# Patient Record
(demographics unavailable — no encounter records)

---

## 2024-10-31 NOTE — HISTORY OF PRESENT ILLNESS
[FreeTextEntry1] : bug bite not healing  [de-identified] : 73yo M new to dermatology here for below:  # initial evaluation of non healing bug bite on left lower leg. Has not used any prescription creams, using anti-itch OTC cream- not improving  #spots all over body that initially start as bug bites then develop into scabs   Derm hx: denies  Skin cancer hx: denies

## 2024-10-31 NOTE — ASSESSMENT
[FreeTextEntry1] : #Rash  - discussed ddx including arthropod hypersensitivity vs psoriasis vs other  - Recommend tangential shave biopsy for definitive diagnosis.- right upper arm - After informed consent was obtained, using Hibiclens for cleansing and 1% Lidocaine with epinephrine for anesthetic, with sterile technique a shave biopsy was used to obtain a biopsy specimen of the lesion. Hemostasis was obtained with aluminum chloride. Vaseline was applied, and wound care instructions provided. The specimen was labeled and sent to pathology for evaluation. The procedure was well tolerated without complication. - The patient will be contacted with biopsy results  #Arthropod hypersensitivity- left lower leg  - education and counseling  - Diagnosis, chronic course and treatment options discussed with patient  - [place] daily emollients recommended (Cerave, Cetaphil, etc).  - Recommend warm water showers. Avoid hot water. Limit shower time <10 mins.  - Start triamcinolone 0.1% ointment BID x AA 2 weeks on, 2 weeks off. SED including atrophy and hypopigmentation. Do not use on face, axilla or groin    RTC 4 weeks

## 2024-10-31 NOTE — PHYSICAL EXAM
[Alert] : alert [Oriented x 3] : ~L oriented x 3 [Well Nourished] : well nourished [Conjunctiva Non-injected] : conjunctiva non-injected [No Visual Lymphadenopathy] : no visual  lymphadenopathy [Declined] : declined [FreeTextEntry3] :  Focused skin exam performed (per patient preference). Areas examined as below:   - left lower leg with erythematous plaque with scale  - multiple scattered erythematous plaques with scale

## 2024-11-14 NOTE — ASSESSMENT
[FreeTextEntry1] : Psoriasis  - Diagnosis, chronic course and treatment options discussed with patient  - [place] daily emollients recommended (Cerave, Cetaphil, etc).  - Recommend warm water showers. Avoid hot water. Limit shower time <10 mins.  - C/w triamcinolone 0.1% ointment BID x AA 2 weeks on, 2 weeks off. SED including atrophy and hypopigmentation. Do not use on face, axilla or groin    #Arthritis  - recommend PCP / rheumatology f/u for evaluation for psoriatic arthritis   RTC 2 months

## 2024-11-14 NOTE — HISTORY OF PRESENT ILLNESS
[FreeTextEntry1] : f/u psoriasis  [de-identified] : 73yo here for f/u psoriasis.BX done last visit on 10/31 consistent with psoriasis   Has been using triamcinolone ointment with improvement of some lesions. Reports joint pains.

## 2024-11-14 NOTE — PHYSICAL EXAM
[Alert] : alert [Oriented x 3] : ~L oriented x 3 [Well Nourished] : well nourished [Declined] : declined [FreeTextEntry3] :  Focused skin exam performed (per patient preference). Areas examined as below:   - left posterior shin with erythematous plaque with scale with surrounding hypopigmentation  - small erythematous plaques on left leg and right hand withs ruth
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
## 2025-02-04 NOTE — PHYSICAL EXAM
[TextEntry] :   GENERAL: Appears in no acute distress HEENT: EOMI. No conjunctival erythema. Moist mucous membranes. No nasopharyngeal ulcers NECK: Supple, no cervical lymphadenopathy CARDIOVASCULAR: RRR. S1, S2 auscultated. PULMONARY: Clear to auscultation b/l ABDOMINAL: nontender, nondistended. MSK: No active synovitis, swelling, erythema, or warmth. No joint tenderness to palpation. [place] node  No deformities. No severe crepitus. Normal ROM of neck, back, b/l upper and lower extremities. No dactylitis, enthesitis, nail pitting SKIN: scaly erythematous rash noted in lower extremities mainly, some in upper arm NEURO: No focal deficits. Motor strength 5/5 in major muscle groups of b/l UE and LE. Sensation to soft touch intact in major dermatomes of b/l UE and LE. PSYCH:Normal affect and thought process.

## 2025-02-20 NOTE — PHYSICAL EXAM
[Alert] : alert [Oriented x 3] : ~L oriented x 3 [Well Nourished] : well nourished [Declined] : declined [FreeTextEntry3] :  Focused skin exam performed (per patient preference). Areas examined as below:   - erythematous scaly plaques b/l lower legs  - hypopigmented patches b/l lower legs and arms

## 2025-02-20 NOTE — ASSESSMENT
[FreeTextEntry1] : #Psoriasis - Diagnosis, chronic course and treatment options discussed with patient - [place] daily emollients recommended (Cerave, Cetaphil, etc). - Recommend warm water showers. Avoid hot water. Limit shower time <10 mins. - C/w triamcinolone 0.1% ointment BID x AA 2 weeks on, 2 weeks off. SED including atrophy and hypopigmentation. Do not use on face, axilla or groin   #Post inflammatory hypopigmentation  - sunscreen use recommended   RTC 4 months

## 2025-02-20 NOTE — HISTORY OF PRESENT ILLNESS
[FreeTextEntry1] : f/u psoriasis [de-identified] : 71yo here for f/u psoriasis. Has been using triamcinolone ointment BID with improvement of some lesions.   Recently saw rheum for joint pains